# Patient Record
Sex: FEMALE | Race: WHITE | NOT HISPANIC OR LATINO | Employment: UNEMPLOYED | URBAN - METROPOLITAN AREA
[De-identification: names, ages, dates, MRNs, and addresses within clinical notes are randomized per-mention and may not be internally consistent; named-entity substitution may affect disease eponyms.]

---

## 2023-10-14 ENCOUNTER — APPOINTMENT (OUTPATIENT)
Dept: RADIOLOGY | Facility: MEDICAL CENTER | Age: 28
End: 2023-10-14
Attending: EMERGENCY MEDICINE
Payer: MEDICAID

## 2023-10-14 ENCOUNTER — HOSPITAL ENCOUNTER (EMERGENCY)
Facility: MEDICAL CENTER | Age: 28
End: 2023-10-14
Attending: EMERGENCY MEDICINE
Payer: MEDICAID

## 2023-10-14 ENCOUNTER — PHARMACY VISIT (OUTPATIENT)
Dept: PHARMACY | Facility: MEDICAL CENTER | Age: 28
End: 2023-10-14
Payer: COMMERCIAL

## 2023-10-14 VITALS
SYSTOLIC BLOOD PRESSURE: 113 MMHG | OXYGEN SATURATION: 96 % | HEIGHT: 62 IN | RESPIRATION RATE: 16 BRPM | TEMPERATURE: 98.4 F | WEIGHT: 111.33 LBS | HEART RATE: 72 BPM | DIASTOLIC BLOOD PRESSURE: 80 MMHG | BODY MASS INDEX: 20.49 KG/M2

## 2023-10-14 DIAGNOSIS — R56.9 SEIZURE-LIKE ACTIVITY (HCC): ICD-10-CM

## 2023-10-14 DIAGNOSIS — R11.0 NAUSEA: ICD-10-CM

## 2023-10-14 DIAGNOSIS — R07.9 CHEST PAIN, UNSPECIFIED TYPE: ICD-10-CM

## 2023-10-14 DIAGNOSIS — M79.605 PAIN IN BOTH LOWER EXTREMITIES: ICD-10-CM

## 2023-10-14 DIAGNOSIS — M79.604 PAIN IN BOTH LOWER EXTREMITIES: ICD-10-CM

## 2023-10-14 LAB
ALBUMIN SERPL BCP-MCNC: 4.9 G/DL (ref 3.2–4.9)
ALBUMIN/GLOB SERPL: 1.8 G/DL
ALP SERPL-CCNC: 65 U/L (ref 30–99)
ALT SERPL-CCNC: 8 U/L (ref 2–50)
ANION GAP SERPL CALC-SCNC: 13 MMOL/L (ref 7–16)
APPEARANCE UR: CLEAR
AST SERPL-CCNC: 14 U/L (ref 12–45)
BASOPHILS # BLD AUTO: 0.7 % (ref 0–1.8)
BASOPHILS # BLD: 0.03 K/UL (ref 0–0.12)
BILIRUB SERPL-MCNC: 0.5 MG/DL (ref 0.1–1.5)
BILIRUB UR QL STRIP.AUTO: NEGATIVE
BUN SERPL-MCNC: 9 MG/DL (ref 8–22)
CALCIUM ALBUM COR SERPL-MCNC: 9 MG/DL (ref 8.5–10.5)
CALCIUM SERPL-MCNC: 9.7 MG/DL (ref 8.5–10.5)
CHLORIDE SERPL-SCNC: 103 MMOL/L (ref 96–112)
CO2 SERPL-SCNC: 23 MMOL/L (ref 20–33)
COLOR UR: YELLOW
CREAT SERPL-MCNC: 0.51 MG/DL (ref 0.5–1.4)
EKG IMPRESSION: NORMAL
EKG IMPRESSION: NORMAL
EOSINOPHIL # BLD AUTO: 0.08 K/UL (ref 0–0.51)
EOSINOPHIL NFR BLD: 1.9 % (ref 0–6.9)
ERYTHROCYTE [DISTWIDTH] IN BLOOD BY AUTOMATED COUNT: 38.6 FL (ref 35.9–50)
GFR SERPLBLD CREATININE-BSD FMLA CKD-EPI: 129 ML/MIN/1.73 M 2
GLOBULIN SER CALC-MCNC: 2.8 G/DL (ref 1.9–3.5)
GLUCOSE SERPL-MCNC: 95 MG/DL (ref 65–99)
GLUCOSE UR STRIP.AUTO-MCNC: NEGATIVE MG/DL
HCG SERPL QL: NEGATIVE
HCT VFR BLD AUTO: 36.8 % (ref 37–47)
HGB BLD-MCNC: 12.8 G/DL (ref 12–16)
IMM GRANULOCYTES # BLD AUTO: 0.01 K/UL (ref 0–0.11)
IMM GRANULOCYTES NFR BLD AUTO: 0.2 % (ref 0–0.9)
KETONES UR STRIP.AUTO-MCNC: ABNORMAL MG/DL
LEUKOCYTE ESTERASE UR QL STRIP.AUTO: NEGATIVE
LIPASE SERPL-CCNC: 21 U/L (ref 11–82)
LYMPHOCYTES # BLD AUTO: 1.77 K/UL (ref 1–4.8)
LYMPHOCYTES NFR BLD: 41.6 % (ref 22–41)
MCH RBC QN AUTO: 30.9 PG (ref 27–33)
MCHC RBC AUTO-ENTMCNC: 34.8 G/DL (ref 32.2–35.5)
MCV RBC AUTO: 88.9 FL (ref 81.4–97.8)
MICRO URNS: ABNORMAL
MONOCYTES # BLD AUTO: 0.39 K/UL (ref 0–0.85)
MONOCYTES NFR BLD AUTO: 9.2 % (ref 0–13.4)
NEUTROPHILS # BLD AUTO: 1.97 K/UL (ref 1.82–7.42)
NEUTROPHILS NFR BLD: 46.4 % (ref 44–72)
NITRITE UR QL STRIP.AUTO: NEGATIVE
NRBC # BLD AUTO: 0 K/UL
NRBC BLD-RTO: 0 /100 WBC (ref 0–0.2)
PH UR STRIP.AUTO: 8 [PH] (ref 5–8)
PLATELET # BLD AUTO: 279 K/UL (ref 164–446)
PMV BLD AUTO: 10.9 FL (ref 9–12.9)
POTASSIUM SERPL-SCNC: 3.5 MMOL/L (ref 3.6–5.5)
PROT SERPL-MCNC: 7.7 G/DL (ref 6–8.2)
PROT UR QL STRIP: NEGATIVE MG/DL
RBC # BLD AUTO: 4.14 M/UL (ref 4.2–5.4)
RBC UR QL AUTO: NEGATIVE
SODIUM SERPL-SCNC: 139 MMOL/L (ref 135–145)
SP GR UR STRIP.AUTO: 1.02
TROPONIN T SERPL-MCNC: <6 NG/L (ref 6–19)
UROBILINOGEN UR STRIP.AUTO-MCNC: 0.2 MG/DL
WBC # BLD AUTO: 4.3 K/UL (ref 4.8–10.8)

## 2023-10-14 PROCEDURE — 36415 COLL VENOUS BLD VENIPUNCTURE: CPT

## 2023-10-14 PROCEDURE — 80053 COMPREHEN METABOLIC PANEL: CPT

## 2023-10-14 PROCEDURE — 85025 COMPLETE CBC W/AUTO DIFF WBC: CPT

## 2023-10-14 PROCEDURE — 700117 HCHG RX CONTRAST REV CODE 255: Performed by: EMERGENCY MEDICINE

## 2023-10-14 PROCEDURE — 70450 CT HEAD/BRAIN W/O DYE: CPT

## 2023-10-14 PROCEDURE — 93005 ELECTROCARDIOGRAM TRACING: CPT | Performed by: EMERGENCY MEDICINE

## 2023-10-14 PROCEDURE — 84484 ASSAY OF TROPONIN QUANT: CPT

## 2023-10-14 PROCEDURE — 700105 HCHG RX REV CODE 258: Performed by: EMERGENCY MEDICINE

## 2023-10-14 PROCEDURE — 83690 ASSAY OF LIPASE: CPT

## 2023-10-14 PROCEDURE — 84703 CHORIONIC GONADOTROPIN ASSAY: CPT

## 2023-10-14 PROCEDURE — 71045 X-RAY EXAM CHEST 1 VIEW: CPT

## 2023-10-14 PROCEDURE — 96374 THER/PROPH/DIAG INJ IV PUSH: CPT | Mod: XU

## 2023-10-14 PROCEDURE — 93005 ELECTROCARDIOGRAM TRACING: CPT

## 2023-10-14 PROCEDURE — 71275 CT ANGIOGRAPHY CHEST: CPT

## 2023-10-14 PROCEDURE — 99285 EMERGENCY DEPT VISIT HI MDM: CPT

## 2023-10-14 PROCEDURE — RXMED WILLOW AMBULATORY MEDICATION CHARGE: Performed by: EMERGENCY MEDICINE

## 2023-10-14 PROCEDURE — 700111 HCHG RX REV CODE 636 W/ 250 OVERRIDE (IP): Performed by: EMERGENCY MEDICINE

## 2023-10-14 PROCEDURE — 81003 URINALYSIS AUTO W/O SCOPE: CPT

## 2023-10-14 RX ORDER — KETOROLAC TROMETHAMINE 30 MG/ML
15 INJECTION, SOLUTION INTRAMUSCULAR; INTRAVENOUS ONCE
Status: COMPLETED | OUTPATIENT
Start: 2023-10-14 | End: 2023-10-14

## 2023-10-14 RX ORDER — SODIUM CHLORIDE, SODIUM LACTATE, POTASSIUM CHLORIDE, CALCIUM CHLORIDE 600; 310; 30; 20 MG/100ML; MG/100ML; MG/100ML; MG/100ML
1000 INJECTION, SOLUTION INTRAVENOUS ONCE
Status: COMPLETED | OUTPATIENT
Start: 2023-10-14 | End: 2023-10-14

## 2023-10-14 RX ORDER — ONDANSETRON 4 MG/1
4 TABLET, ORALLY DISINTEGRATING ORAL EVERY 6 HOURS PRN
Qty: 10 TABLET | Refills: 0 | Status: SHIPPED | OUTPATIENT
Start: 2023-10-14

## 2023-10-14 RX ADMIN — SODIUM CHLORIDE, POTASSIUM CHLORIDE, SODIUM LACTATE AND CALCIUM CHLORIDE 1000 ML: 600; 310; 30; 20 INJECTION, SOLUTION INTRAVENOUS at 11:41

## 2023-10-14 RX ADMIN — IOHEXOL 44 ML: 350 INJECTION, SOLUTION INTRAVENOUS at 13:00

## 2023-10-14 RX ADMIN — KETOROLAC TROMETHAMINE 15 MG: 30 INJECTION, SOLUTION INTRAMUSCULAR; INTRAVENOUS at 11:40

## 2023-10-14 ASSESSMENT — PAIN DESCRIPTION - PAIN TYPE: TYPE: CHRONIC PAIN;ACUTE PAIN

## 2023-10-14 ASSESSMENT — LIFESTYLE VARIABLES: DO YOU DRINK ALCOHOL: NO

## 2023-10-14 NOTE — DISCHARGE INSTRUCTIONS
You were seen in the ER for chest pain, nausea, seizure-like episodes.  Thankfully, your labs and imaging today are reassuring and you do not require additional work-up, consultation, or admission to the hospital.  It is going to be very important that you follow-up as an outpatient and I have placed referrals to both neurology and primary care in your behalf.  I sent in a prescription for Zofran which is a nausea medication to the hospital's pharmacy, pick it up today.  I have also given you a list of local clinics where you can establish.  Please follow-up with a primary care physician as soon as possible.  Return to the ER with any new or worsening symptoms.  I hope you feel better soon!

## 2023-10-14 NOTE — ED NOTES
EKG obtained and presented to Dr. Olson. Patient family members present in room while obtaining EKG.

## 2023-10-14 NOTE — ED TRIAGE NOTES
"Tameka Cunningham  28 y.o. female  Chief Complaint   Patient presents with    Seizure     X 2 witnessed Grand Mal x 15 seconds today. Recent leg and abdominal 10/10 pain x 2 weeks, \"it feels like it's from blood clots, I've been taking Aspirin x 2 weeks and I took Kava yesterday to try and break up the clots.\" Complicated medical history including Monster Patel. Hx seizures, no prescribed medications     Chest Pain     Onset today chest tightness, EKG performed    Headache     Migraine this am with neck pain       Pt ambulatory to triage with steady gait for above complaint.     Pt is GCS 15, speaking in full sentences, follows commands and responds appropriately to questions. Resp are even and unlabored.     Abdominal pain protocol ordered. Charge RN aware of patient. Pt placed in draw room. Pt educated on triage process. Pt encouraged to alert staff for any changes.       BP (!) 144/97   Pulse 71   Temp 37.2 °C (98.9 °F) (Temporal)   Resp 18   Ht 1.575 m (5' 2\")   Wt 50.5 kg (111 lb 5.3 oz)   SpO2 100%   BMI 20.36 kg/m²     "

## 2023-10-14 NOTE — ED NOTES
Patient educate on need for urine sample collection, verbalized understanding. Updated on POC, no questions, medicated per orders, see MAR.

## 2023-10-14 NOTE — ED NOTES
Patient A&O, SANDERS, c/o generalized pain in arms and legs, denies n/t.  PIV placed, blood sent to lab.  Imaging at bedside.

## 2023-10-14 NOTE — ED PROVIDER NOTES
"ED Provider Note    CHIEF COMPLAINT  Chief Complaint   Patient presents with    Seizure     X 2 witnessed Grand Mal x 15 seconds today. Recent leg and abdominal 10/10 pain x 2 weeks, \"it feels like it's from blood clots, I've been taking Aspirin x 2 weeks and I took Kava yesterday to try and break up the clots.\" Complicated medical history including Monster Patel. Hx seizures, no prescribed medications     Chest Pain     Onset today chest tightness, EKG performed    Headache     Migraine this am with neck pain       EXTERNAL RECORDS REVIEWED  Other none available    HPI/ROS  LIMITATION TO HISTORY   Select: : None  OUTSIDE HISTORIAN(S):  Parent mother at bedside states that she witnessed one of the seizure-like events which lasted approximately 10 seconds.  Eyes were open and after resolution of the episode she appeared to be largely back to normal and Significant other - at bedside witnessed one of the seizure-like events today which lasted approximately 15 seconds.  Patient's eyes were reportedly open and after the event she seemed slightly confused but not postictal.    Tameka Cunningham is a 28 y.o. female who presents with multiple concerns.  Patient has a very complicated medical history which has been all worked up at multiple facilities in [place].  She, her mother, and  moved from [place] to [place] a year ago and since that time she has not established with any medical providers due to financial limitations.  The patient and her family are currently in route to [place], [place] to visit her father-in-law but 2 weeks ago she developed pain in the backs of her bilateral upper thighs which has been persistently worsening and yesterday made it almost impossible for her to sit in the car and drive.  She was concerned because she then developed swelling in both legs, worse on the left, which then radiated into her abdomen, tailbone, and today she developed chest pain.  She has had some bilateral " arm pain as well and earlier today she had 2 seizure-like episodes.  She reportedly does have a history of seizures although was not on any medication.  Her seizures were similar to seizures she has experienced in the past but today's appeared to be more significant with all over body shaking.  She remembers the episodes although cannot speak during them and cannot control her eyes.  There is no tongue trauma or loss of continence.  She states she has had a SPECT exam and was diagnosed both with pseudoseizures and an additional type of medical seizure, the name of which she does not recall.  She also has a history of EBV and POTS and is currently undergoing testing for Nicky-Danlos syndrome.  She did some looking on the Internet and is concerned that she has May Thurner syndrome.  With the chest pain and improvement in her leg pain she became concerned that she had thrown a clot from her leg to a PE although does not have a history of the same.  This ultimately prompted her to come to the ER for evaluation.  For the past 2 weeks she has been taking aspirin as well as mint and kava to try to thin her blood.    PAST MEDICAL HISTORY       SURGICAL HISTORY  patient denies any surgical history    FAMILY HISTORY  History reviewed. No pertinent family history.    SOCIAL HISTORY  Social History     Tobacco Use    Smoking status: Never    Smokeless tobacco: Never   Vaping Use    Vaping Use: Never used   Substance and Sexual Activity    Alcohol use: Never    Drug use: Yes     Comment: marijuana    Sexual activity: Not on file       CURRENT MEDICATIONS  Home Medications       Reviewed by Bernard Tucker R.N. (Registered Nurse) on 10/14/23 at 1044  Med List Status: Partial     Medication Last Dose Status        Patient Storm Taking any Medications                           ALLERGIES  Allergies   Allergen Reactions    Bactrim Ds Hives    Fd&C Yellow #5 [place] [Tartrazine]     Morphine Hives    Sulfa Drugs Hives  "      PHYSICAL EXAM  VITAL SIGNS: BP (!) 144/97   Pulse 71   Temp 37.2 °C (98.9 °F) (Temporal)   Resp 18   Ht 1.575 m (5' 2\")   Wt 50.5 kg (111 lb 5.3 oz)   SpO2 100%   BMI 20.36 kg/m²    Physical Exam  Vitals and nursing note reviewed.   Constitutional:       Appearance: She is well-developed.   HENT:      Head: Normocephalic and atraumatic.      Comments: Dry mucous membranes  Eyes:      Extraocular Movements: Extraocular movements intact.      Pupils: Pupils are equal, round, and reactive to light.   Cardiovascular:      Rate and Rhythm: Normal rate and regular rhythm.      Heart sounds: Normal heart sounds.   Pulmonary:      Effort: Pulmonary effort is normal.   Abdominal:      Palpations: Abdomen is soft.      Tenderness: There is no abdominal tenderness.   Musculoskeletal:      Cervical back: Normal range of motion and neck supple.      Right lower leg: No edema.      Left lower leg: No edema.   Skin:     General: Skin is warm and dry.   Neurological:      General: No focal deficit present.      Mental Status: She is alert and oriented to person, place, and time.      Cranial Nerves: No cranial nerve deficit.   Psychiatric:         Mood and Affect: Mood is anxious.         Behavior: Behavior normal.       DIAGNOSTIC STUDIES / PROCEDURES  LABS  Results for orders placed or performed during the hospital encounter of 10/14/23   CBC WITH DIFFERENTIAL   Result Value Ref Range    WBC 4.3 (L) 4.8 - 10.8 K/uL    RBC 4.14 (L) 4.20 - 5.40 M/uL    Hemoglobin 12.8 12.0 - 16.0 g/dL    Hematocrit 36.8 (L) 37.0 - 47.0 %    MCV 88.9 81.4 - 97.8 fL    MCH 30.9 27.0 - 33.0 pg    MCHC 34.8 32.2 - 35.5 g/dL    RDW 38.6 35.9 - 50.0 fL    Platelet Count 279 164 - 446 K/uL    MPV 10.9 9.0 - 12.9 fL    Neutrophils-Polys 46.40 44.00 - 72.00 %    Lymphocytes 41.60 (H) 22.00 - 41.00 %    Monocytes 9.20 0.00 - 13.40 %    Eosinophils 1.90 0.00 - 6.90 %    Basophils 0.70 0.00 - 1.80 %    Immature Granulocytes 0.20 0.00 - 0.90 %    " Nucleated RBC 0.00 0.00 - 0.20 /100 WBC    Neutrophils (Absolute) 1.97 1.82 - 7.42 K/uL    Lymphs (Absolute) 1.77 1.00 - 4.80 K/uL    Monos (Absolute) 0.39 0.00 - 0.85 K/uL    Eos (Absolute) 0.08 0.00 - 0.51 K/uL    Baso (Absolute) 0.03 0.00 - 0.12 K/uL    Immature Granulocytes (abs) 0.01 0.00 - 0.11 K/uL    NRBC (Absolute) 0.00 K/uL   COMP METABOLIC PANEL   Result Value Ref Range    Sodium 139 135 - 145 mmol/L    Potassium 3.5 (L) 3.6 - 5.5 mmol/L    Chloride 103 96 - 112 mmol/L    Co2 23 20 - 33 mmol/L    Anion Gap 13.0 7.0 - 16.0    Glucose 95 65 - 99 mg/dL    Bun 9 8 - 22 mg/dL    Creatinine 0.51 0.50 - 1.40 mg/dL    Calcium 9.7 8.5 - 10.5 mg/dL    Correct Calcium 9.0 8.5 - 10.5 mg/dL    AST(SGOT) 14 12 - 45 U/L    ALT(SGPT) 8 2 - 50 U/L    Alkaline Phosphatase 65 30 - 99 U/L    Total Bilirubin 0.5 0.1 - 1.5 mg/dL    Albumin 4.9 3.2 - 4.9 g/dL    Total Protein 7.7 6.0 - 8.2 g/dL    Globulin 2.8 1.9 - 3.5 g/dL    A-G Ratio 1.8 g/dL   LIPASE   Result Value Ref Range    Lipase 21 11 - 82 U/L   URINALYSIS,CULTURE IF INDICATED    Specimen: Urine   Result Value Ref Range    Color Yellow     Character Clear     Specific Gravity 1.017 <1.035    Ph 8.0 5.0 - 8.0    Glucose Negative Negative mg/dL    Ketones Trace (A) Negative mg/dL    Protein Negative Negative mg/dL    Bilirubin Negative Negative    Urobilinogen, Urine 0.2 Negative    Nitrite Negative Negative    Leukocyte Esterase Negative Negative    Occult Blood Negative Negative    Micro Urine Req see below    HCG QUAL SERUM   Result Value Ref Range    Beta-Hcg Qualitative Serum Negative Negative   TROPONIN   Result Value Ref Range    Troponin T <6 6 - 19 ng/L   ESTIMATED GFR   Result Value Ref Range    GFR (CKD-EPI) 129 >60 mL/min/1.73 m 2   EKG   Result Value Ref Range    Report       [facility] Emergency Dept.    Test Date:  2023-10-14  Pt Name:    JUAN DAVID ELENA               Department: ER  MRN:        [id number]                       Room:  Gender:     Female                       Technician: [id number]  :        1995                   Requested By:ER TRIAGE PROTOCOL  Order #:    [id number]                    Reading MD: Ranulfo Soto MD    Measurements  Intervals                                Axis  Rate:       71                           P:          66  CO:         136                          QRS:        64  QRSD:       85                           T:          28  QT:         380  QTc:        413    Interpretive Statements  Sinus rhythm  Atrial premature complex  No previous ECG available for comparison  Electronically Signed On 10- 16:11:22 PDT by Ranulfo Soto MD     EKG   Result Value Ref Range    Report       [facility] Emergency Dept.    Test Date:  2023-10-14  Pt Name:    JUAN DAVID ELENA               Department: ER  MRN:        [id number]                      Room:        10  Gender:     Female                       Technician: [id number]  :        1995                   Requested By:RANULFO SOTO  Order #:    [id number]                    Reading MD: Ranulfo Soto MD    Measurements  Intervals                                Axis  Rate:       61                           P:          66  CO:         152                          QRS:        56  QRSD:       93                           T:          33  QT:         392  QTc:        395    Interpretive Statements  Sinus rhythm  Compared to ECG 10/14/2023 10:30:14  Atrial premature complex(es) no longer present  Electronically Signed On 10- 16:11:18 PDT by Ranulfo Soto MD       RADIOLOGY  I have independently interpreted the diagnostic imaging associated with this visit and am waiting the final reading from the radiologist.   My preliminary interpretation is as follows: No PE    Radiologist interpretation:   CT-CTA CHEST PULMONARY ARTERY W/ RECONS   Final Result      No pulmonary embolus. No acute abnormality in the chest.         CT-HEAD W/O   Final Result       No CT evidence of acute infarct, hemorrhage or mass.         DX-CHEST-PORTABLE (1 VIEW)   Final Result      No acute cardiopulmonary abnormality.           COURSE & MEDICAL DECISION MAKING    ED Observation Status? Yes; I am placing the patient in to an observation status due to a diagnostic uncertainty as well as therapeutic intensity. Patient placed in observation status at 10:57 AM, 10/14/2023.     Observation plan is as follows: Labs, imaging, medication, IV fluids    Upon Reevaluation, the patient's condition has: Improved; and will be discharged.    Patient discharged from ED Observation status at 1:21 PM (Time) 10/14/2023 (Date).     INITIAL ASSESSMENT, COURSE AND PLAN  Care Narrative: This is a francie 28-year-old female with an extensive and complicated medical history and multiple work-ups which have all been performed in [place] at multiple different facilities who is here with chest tightness and pain that began today in the context of pain in the back of both of her upper thighs and 2 seizure-like episodes.    Differential diagnosis includes, but is not limited to, PE, cellulitis, abscess, and STI, seizure, intracranial mass, pseudoseizure, electrolyte abnormality, ACS, viral syndrome, madhavi-/myocarditis.    Patient arrived initially to triage tachycardic but heart rate had improved to normal prior to EKG.  Her heart rate was intermittently greater than 100 during my history and she does appear to be quite dehydrated with dry mucous membranes.  Lungs are clear.  She has no cardiac murmurs or rubs.  Abdomen is soft and nontender.  She does have some tenderness over the posterior lateral lower part of her buttock extending into the upper thigh bilaterally but there is no overlying erythema, fluctuance, crepitus.  Low suspicion for cellulitis, abscess, necrotizing fasciitis.  There is no objective swelling of the extremities and the distribution of pain is not where I would expect any sort of  thrombotic event-low suspicion for DVT.  However with the patient's significant concern, recent immobilization during travel, I do feel it is reasonable to obtain CTA of the chest to rule out PE especially with the tachycardia.  She will also undergo brain imaging given these frequent seizure-like episodes.  Mother and  state that the patient has had episodes like this for years.  She has been diagnosed with pseudoseizure in the past as well as another unknown type of seizure but is not currently medicated and has been unable to follow-up with neurology due to financial limitations as noted above.  She is currently neurologically at her baseline.  She is alert, oriented, interactive, moves all 4 extremities, and has normal cranial nerve exam.  Mother states that if she does not smoke marijuana she has these seizures daily.  It is really unclear to me at this point if these are pseudoseizures as she remains alert throughout the entirety of the episodes but given that she is at her neurologic baseline I do not think that EEG, neurology consultation, or hospitalization is indicated.  The initial tachycardia may be due to her diagnosis of POTS and she does appear to be dehydrated for which she will receive IV fluids.    EKG reveals normal sinus rhythm without any signs of acute ischemia.    CBC demonstrates leukopenia which the patient notes is chronic for her.  She thinks that she has been told that this is due to her history of EBV.  Metabolic panel is normal across-the-board with the exception of very mild potassium to 3.5 which does not require repletion in the ED.  Troponin is negative.  Heart score 0.  hCG is negative.  Both CTA chest and CT head were without acute abnormality thankfully.  Patient was given a dose of Toradol and reevaluated at bedside.  She is resting comfortably.  Although she is certainly medically complicated her vital signs remain normal and stable.  Other than her initial tachycardia  when she initially checked in her heart rate has been normal and was significantly improved with IV fluids.  It is possible that she had some element of muscle spasm given that she has been seated for quite some time during the drive from [place].  I do not think that there is indication for antibiotics at this point.  I do not think she requires additional imaging.  She does not meet criteria for hospitalization.  She is safe for discharge but we discussed how important will be for her to follow-up with both neurology and primary care as an outpatient.  She is considering moving to the [facility] so I have placed referrals on her behalf.  I also gave her the contact information for several low-cost clinics and encouraged her to schedule close follow-up.  She was discharged in good and stable condition with very strict return precautions    HYDRATION: Based on the patient's presentation of Tachycardia the patient was given IV fluids. IV Hydration was used because oral hydration was not adequate alone. Upon recheck following hydration, the patient was improved.    ADDITIONAL PROBLEM LIST  None  DISPOSITION AND DISCUSSIONS  I have discussed management of the patient with the following physicians and BRIDGER's: None    Discussion of management with other QHP or appropriate source(s): None     Escalation of care considered, and ultimately not performed:acute inpatient care management, however at this time, the patient is most appropriate for outpatient management    Barriers to care at this time, including but not limited to: Patient does not have established PCP and Patient lacks financial resources.     Decision tools and prescription drugs considered including, but not limited to: HEART Score 0 .    FINAL DIAGNOSIS  1. Seizure-like activity (HCC)    2. Chest pain, unspecified type    3. Pain in both lower extremities    4. Nausea      Electronically signed by: Giovany Olson M.D., 10/14/2023 10:57 AM

## 2023-10-25 ENCOUNTER — TELEPHONE (OUTPATIENT)
Dept: HEALTH INFORMATION MANAGEMENT | Facility: OTHER | Age: 28
End: 2023-10-25
Payer: MEDICAID

## 2023-12-12 ENCOUNTER — TELEPHONE (OUTPATIENT)
Dept: HEALTH INFORMATION MANAGEMENT | Facility: OTHER | Age: 28
End: 2023-12-12
